# Patient Record
Sex: MALE | Race: WHITE | NOT HISPANIC OR LATINO | Employment: OTHER | URBAN - NONMETROPOLITAN AREA
[De-identification: names, ages, dates, MRNs, and addresses within clinical notes are randomized per-mention and may not be internally consistent; named-entity substitution may affect disease eponyms.]

---

## 2023-05-09 ENCOUNTER — APPOINTMENT (OUTPATIENT)
Dept: PRIMARY CARE | Facility: CLINIC | Age: 73
End: 2023-05-09
Payer: COMMERCIAL

## 2023-05-31 ENCOUNTER — OFFICE VISIT (OUTPATIENT)
Dept: PRIMARY CARE | Facility: CLINIC | Age: 73
End: 2023-05-31
Payer: COMMERCIAL

## 2023-05-31 VITALS
WEIGHT: 192 LBS | HEART RATE: 86 BPM | SYSTOLIC BLOOD PRESSURE: 102 MMHG | HEIGHT: 76 IN | BODY MASS INDEX: 23.38 KG/M2 | DIASTOLIC BLOOD PRESSURE: 58 MMHG

## 2023-05-31 DIAGNOSIS — R80.1 PERSISTENT PROTEINURIA: ICD-10-CM

## 2023-05-31 DIAGNOSIS — E87.1 LOW SODIUM LEVELS: ICD-10-CM

## 2023-05-31 DIAGNOSIS — L85.3 DRY SKIN: ICD-10-CM

## 2023-05-31 DIAGNOSIS — F32.9 REACTIVE DEPRESSION: ICD-10-CM

## 2023-05-31 DIAGNOSIS — E11.9 TYPE 2 DIABETES MELLITUS WITHOUT COMPLICATION, WITHOUT LONG-TERM CURRENT USE OF INSULIN (MULTI): Primary | ICD-10-CM

## 2023-05-31 DIAGNOSIS — K21.9 GASTROESOPHAGEAL REFLUX DISEASE WITHOUT ESOPHAGITIS: ICD-10-CM

## 2023-05-31 PROBLEM — Z95.1 HISTORY OF QUADRUPLE BYPASS: Status: ACTIVE | Noted: 2023-05-31

## 2023-05-31 PROBLEM — F32.A DEPRESSION: Status: ACTIVE | Noted: 2023-05-31

## 2023-05-31 PROBLEM — R80.9 PROTEINURIA: Status: ACTIVE | Noted: 2023-05-31

## 2023-05-31 PROCEDURE — 99204 OFFICE O/P NEW MOD 45 MIN: CPT | Performed by: INTERNAL MEDICINE

## 2023-05-31 PROCEDURE — 1160F RVW MEDS BY RX/DR IN RCRD: CPT | Performed by: INTERNAL MEDICINE

## 2023-05-31 PROCEDURE — 3078F DIAST BP <80 MM HG: CPT | Performed by: INTERNAL MEDICINE

## 2023-05-31 PROCEDURE — 1036F TOBACCO NON-USER: CPT | Performed by: INTERNAL MEDICINE

## 2023-05-31 PROCEDURE — 3074F SYST BP LT 130 MM HG: CPT | Performed by: INTERNAL MEDICINE

## 2023-05-31 PROCEDURE — 1159F MED LIST DOCD IN RCRD: CPT | Performed by: INTERNAL MEDICINE

## 2023-05-31 RX ORDER — AMMONIUM LACTATE 12 G/100G
LOTION TOPICAL AS NEEDED
Qty: 396 G | Refills: 1 | Status: SHIPPED | OUTPATIENT
Start: 2023-05-31 | End: 2024-05-30

## 2023-05-31 ASSESSMENT — ENCOUNTER SYMPTOMS
FEVER: 0
NUMBNESS: 0
SHORTNESS OF BREATH: 0
ABDOMINAL PAIN: 0
SINUS PRESSURE: 0
SORE THROAT: 0
BACK PAIN: 0
TROUBLE SWALLOWING: 0
NERVOUS/ANXIOUS: 0
UNEXPECTED WEIGHT CHANGE: 0
COUGH: 0
CONSTIPATION: 0
ABDOMINAL DISTENTION: 0
DIZZINESS: 0
ACTIVITY CHANGE: 0
NECK PAIN: 0
EYE DISCHARGE: 0
APPETITE CHANGE: 0
ARTHRALGIAS: 0
VOMITING: 0
HALLUCINATIONS: 0
DIARRHEA: 0
HEADACHES: 0
NAUSEA: 0
WEAKNESS: 0
FREQUENCY: 0
BLOOD IN STOOL: 0
RHINORRHEA: 0
DYSURIA: 0
FATIGUE: 0

## 2023-05-31 ASSESSMENT — PATIENT HEALTH QUESTIONNAIRE - PHQ9
SUM OF ALL RESPONSES TO PHQ9 QUESTIONS 1 AND 2: 0
2. FEELING DOWN, DEPRESSED OR HOPELESS: NOT AT ALL
1. LITTLE INTEREST OR PLEASURE IN DOING THINGS: NOT AT ALL

## 2023-05-31 NOTE — PROGRESS NOTES
"Subjective   Patient ID: Lisbeth Pastrana is a 73 y.o. male who presents for Establish Care (NPV).  HPI  Patient is a 73 y.o. male patient who is here today to establish care.  Patient reports that his wife  in .   Patient finds that he is struggling with being along and being old.   Pt reports that he was diagnosed with DMII in 2018, was seeing endo was on metformin and had lots of side effects and did not like it.   No polyuria or frequent thirst.     Review of Systems   Constitutional:  Negative for activity change, appetite change, fatigue, fever and unexpected weight change.   HENT:  Negative for congestion, ear discharge, ear pain, nosebleeds, postnasal drip, rhinorrhea, sinus pressure, sneezing, sore throat, tinnitus and trouble swallowing.    Eyes:  Negative for discharge.   Respiratory:  Negative for cough and shortness of breath.    Cardiovascular:  Negative for chest pain.   Gastrointestinal:  Negative for abdominal distention, abdominal pain, blood in stool, constipation, diarrhea, nausea and vomiting.   Endocrine: Negative for cold intolerance.   Genitourinary:  Negative for dysuria and frequency.   Musculoskeletal:  Negative for arthralgias, back pain and neck pain.   Skin:  Negative for rash.   Neurological:  Negative for dizziness, weakness, numbness and headaches.   Psychiatric/Behavioral:  Negative for hallucinations. The patient is not nervous/anxious.        Objective   /58 (BP Location: Right arm, Patient Position: Sitting, BP Cuff Size: Adult)   Pulse 86   Ht 1.93 m (6' 4\")   Wt 87.1 kg (192 lb)   BMI 23.37 kg/m²     Physical Exam  Constitutional:       General: He is not in acute distress.     Appearance: Normal appearance. He is not toxic-appearing.   HENT:      Head: Normocephalic and atraumatic.      Nose: Nose normal.      Mouth/Throat:      Mouth: Mucous membranes are moist.      Pharynx: Oropharynx is clear.   Eyes:      Extraocular Movements: Extraocular movements " intact.      Conjunctiva/sclera: Conjunctivae normal.      Pupils: Pupils are equal, round, and reactive to light.   Cardiovascular:      Rate and Rhythm: Normal rate and regular rhythm.      Heart sounds: No murmur heard.     No friction rub. No gallop.   Pulmonary:      Effort: Pulmonary effort is normal.      Breath sounds: Normal breath sounds.   Abdominal:      General: Bowel sounds are normal. There is no distension.      Palpations: Abdomen is soft. There is no mass.      Tenderness: There is no abdominal tenderness. There is no guarding.   Musculoskeletal:         General: No swelling. Normal range of motion.      Cervical back: Normal range of motion.   Skin:     General: Skin is warm and dry.   Neurological:      General: No focal deficit present.      Mental Status: He is alert and oriented to person, place, and time.   Psychiatric:         Mood and Affect: Mood normal.         Thought Content: Thought content normal.         Judgment: Judgment normal.         Flu shot declines   PNA declines   COVID declines   Shingrix declines     Colonoscopy has never had declines colonoscopy or cologuard   PSA --     Assessment/Plan   Problem List Items Addressed This Visit          Digestive    GERD (gastroesophageal reflux disease)       Endocrine/Metabolic    Type 2 diabetes mellitus without complication, without long-term current use of insulin (CMS/[facility]) - Primary       Other    Low sodium levels    Proteinuria     Hx of  DMII   - will order cbc, cmp, a1c, lipid panel, microalbumin   - not currently on any medication   - was previously more diet controlled     2. Depression wife  in , struggling with being alone and getting older   - will check tsh   - discussed grief counselor, will refer to [facility]     3. Hx of CABG x 4, CAD    - will check lipid panel  - not currently on statin or any medications     4. Will obtain previous records     5. Will see him back in 3 mo to review labwork and see how  the depression is doing with grief counselor   Final diagnoses:   [E11.9] Type 2 diabetes mellitus without complication, without long-term current use of insulin (CMS/[facility])   [K21.9] Gastroesophageal reflux disease without esophagitis   [R80.1] Persistent proteinuria   [E87.1] Low sodium levels

## 2023-08-31 ENCOUNTER — APPOINTMENT (OUTPATIENT)
Dept: PRIMARY CARE | Facility: CLINIC | Age: 73
End: 2023-08-31
Payer: COMMERCIAL